# Patient Record
Sex: MALE | HISPANIC OR LATINO | URBAN - METROPOLITAN AREA
[De-identification: names, ages, dates, MRNs, and addresses within clinical notes are randomized per-mention and may not be internally consistent; named-entity substitution may affect disease eponyms.]

---

## 2017-02-08 ENCOUNTER — EMERGENCY (EMERGENCY)
Facility: HOSPITAL | Age: 42
LOS: 0 days | Discharge: HOME | End: 2017-02-08
Attending: EMERGENCY MEDICINE | Admitting: EMERGENCY MEDICINE
Payer: OTHER MISCELLANEOUS

## 2017-02-08 VITALS
OXYGEN SATURATION: 100 % | RESPIRATION RATE: 18 BRPM | TEMPERATURE: 99 F | DIASTOLIC BLOOD PRESSURE: 81 MMHG | HEART RATE: 64 BPM | SYSTOLIC BLOOD PRESSURE: 124 MMHG

## 2017-02-08 VITALS — WEIGHT: 179.9 LBS

## 2017-02-08 DIAGNOSIS — R07.89 OTHER CHEST PAIN: ICD-10-CM

## 2017-02-08 DIAGNOSIS — Z91.81 HISTORY OF FALLING: ICD-10-CM

## 2017-02-08 PROCEDURE — 99282 EMERGENCY DEPT VISIT SF MDM: CPT

## 2017-02-08 PROCEDURE — 71020: CPT | Mod: 26

## 2017-02-08 PROCEDURE — 71100 X-RAY EXAM RIBS UNI 2 VIEWS: CPT | Mod: 26,RT
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
## 2017-02-08 NOTE — ED STATDOCS - DETAILS:
I personally saw the patient with the PA, and completed the key components of the history and physical exam. I then discussed the management plan with the PA.  I, Tremayne Hernandez, performed the initial face to face bedside interview with this patient regarding history of present illness, review of symptoms and relevant past medical, social and family history.  I completed an independent physical examination.  I was the initial provider who evaluated this patient. I have signed out the follow up of any pending tests (i.e. labs, radiological studies) to the ACP.  I have communicated the patient’s plan of care and disposition with the ACP.  The history, relevant review of systems, past medical and surgical history, medical decision making, and physical examination was documented by the scribe in my presence and I attest to the accuracy of the documentation.

## 2017-02-08 NOTE — ED STATDOCS - PROGRESS NOTE DETAILS
RAUL Orellana: pt re-evaluated. appears comfortable. xray reviewed by dr vega and myself, no fracture seen. pt refused pain meds in ed. pt has pcp to follow up with. requesting to be discharged home. stable for discharge

## 2019-03-01 ENCOUNTER — EMERGENCY (EMERGENCY)
Facility: HOSPITAL | Age: 44
LOS: 0 days | Discharge: HOME | End: 2019-03-01
Attending: EMERGENCY MEDICINE | Admitting: EMERGENCY MEDICINE
Payer: COMMERCIAL

## 2019-03-01 VITALS
SYSTOLIC BLOOD PRESSURE: 139 MMHG | HEIGHT: 66 IN | RESPIRATION RATE: 18 BRPM | HEART RATE: 66 BPM | OXYGEN SATURATION: 98 % | WEIGHT: 169.98 LBS | DIASTOLIC BLOOD PRESSURE: 89 MMHG | TEMPERATURE: 98 F
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
## 2019-03-01 DIAGNOSIS — M25.512 PAIN IN LEFT SHOULDER: ICD-10-CM
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
## 2019-03-01 PROCEDURE — 73000 X-RAY EXAM OF COLLAR BONE: CPT | Mod: 26,LT

## 2019-03-01 PROCEDURE — 73030 X-RAY EXAM OF SHOULDER: CPT | Mod: 26,RT

## 2019-03-01 PROCEDURE — 99053 MED SERV 10PM-8AM 24 HR FAC: CPT

## 2019-03-01 PROCEDURE — 99283 EMERGENCY DEPT VISIT LOW MDM: CPT | Mod: 25

## 2019-03-01 RX ORDER — IBUPROFEN 200 MG
600 TABLET ORAL ONCE
Qty: 0 | Refills: 0 | Status: COMPLETED | OUTPATIENT
Start: 2019-03-01 | End: 2019-03-01

## 2019-03-01 RX ADMIN — Medication 600 MILLIGRAM(S): at 07:58
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
## 2022-05-31 ENCOUNTER — EMERGENCY (EMERGENCY)
Facility: HOSPITAL | Age: 47
LOS: 0 days | Discharge: HOME | End: 2022-05-31
Attending: EMERGENCY MEDICINE
Payer: COMMERCIAL

## 2022-05-31 VITALS
RESPIRATION RATE: 18 BRPM | OXYGEN SATURATION: 98 % | SYSTOLIC BLOOD PRESSURE: 117 MMHG | DIASTOLIC BLOOD PRESSURE: 67 MMHG | HEART RATE: 62 BPM | TEMPERATURE: 99 F

## 2022-05-31 VITALS — HEIGHT: 66 IN | WEIGHT: 199.96 LBS

## 2022-05-31 DIAGNOSIS — L25.8 UNSPECIFIED CONTACT DERMATITIS DUE TO OTHER AGENTS: ICD-10-CM

## 2022-05-31 DIAGNOSIS — L29.9 PRURITUS, UNSPECIFIED: ICD-10-CM

## 2022-05-31 DIAGNOSIS — R21 RASH AND OTHER NONSPECIFIC SKIN ERUPTION: ICD-10-CM

## 2022-05-31 PROCEDURE — 87798 DETECT AGENT NOS DNA AMP: CPT

## 2022-05-31 PROCEDURE — 36415 COLL VENOUS BLD VENIPUNCTURE: CPT

## 2022-05-31 PROCEDURE — 99284 EMERGENCY DEPT VISIT MOD MDM: CPT

## 2022-05-31 PROCEDURE — 86753 PROTOZOA ANTIBODY NOS: CPT

## 2022-05-31 PROCEDURE — 99283 EMERGENCY DEPT VISIT LOW MDM: CPT

## 2022-05-31 PROCEDURE — 86618 LYME DISEASE ANTIBODY: CPT

## 2022-05-31 RX ORDER — DIPHENHYDRAMINE HCL 50 MG
50 CAPSULE ORAL ONCE
Refills: 0 | Status: COMPLETED | OUTPATIENT
Start: 2022-05-31 | End: 2022-05-31

## 2022-05-31 RX ADMIN — Medication 60 MILLIGRAM(S): at 20:35

## 2022-05-31 RX ADMIN — Medication 50 MILLIGRAM(S): at 20:36

## 2022-05-31 NOTE — ED STATDOCS - PHYSICAL EXAMINATION
Constitutional: NAD AAOx3  Eyes: PERRLA EOMI  Head: Normocephalic atraumatic  Mouth: MMM  Cardiac: regular rate   Resp: Lungs CTAB  GI: Abd s/nt/nd  Neuro: CN2-12 intact  Skin: +large rash to let antecubital fossa, left upper arm, left chest wall. not vesicular mainly crusted over. one area on left abd that is target shaped.

## 2022-05-31 NOTE — ED STATDOCS - OBJECTIVE STATEMENT
48 y/o male presents to the ED with rash to left arm and abd. Pt states he was in contact with poison ivy 2 weeks ago, pt does yard work. Noticed poison ivy rash, which he has had before. Has tried OTC creams which did not help. Now pt c/o worsening itching. No fevers or joint aches.

## 2022-05-31 NOTE — ED STATDOCS - PATIENT PORTAL LINK FT
You can access the FollowMyHealth Patient Portal offered by [facility] by registering at the following website: [email or web address]. By joining [employer]’s FollowMyHealth portal, you will also be able to view your health information using other applications (apps) compatible with our system.

## 2022-05-31 NOTE — ED STATDOCS - PROGRESS NOTE DETAILS
48 y/o male presents to the ED for skin rash in the setting of being exposed to poison ivy. Will treat with steroids, send off tick panel and discharge. pt agrees with plan and will call for tick results. -Lynne Booth PA-C

## 2022-05-31 NOTE — ED ADULT TRIAGE NOTE - CHIEF COMPLAINT QUOTE
pt arrived c/o poison ivy on left arm for the last two weeks. pt states he has tried many creams on the rash with no relief. pt endorses itchiness and burning to the area. area appears dry and red. no sign of distress noted.

## 2022-05-31 NOTE — ED STATDOCS - NS ED ROS FT
Constitutional: No fever or chills  Eyes: No visual changes  HEENT: No throat pain  CV: No chest pain  Resp: No SOB no cough  GI: No abd pain, nausea or vomiting  : No dysuria  MSK: No musculoskeletal pain  Skin: +rash  Neuro: No headache

## 2022-05-31 NOTE — ED STATDOCS - ATTENDING APP SHARED VISIT CONTRIBUTION OF CARE
I, Franki Troy MD, personally saw the patient with ACP.  I have personally performed a face to face diagnostic evaluation on this patient.  I have reviewed the ACP note and agree with the history, exam, and plan of care, except as noted.   The initial assessment was performed by myself and then the patient was handed off to the ACP. The patient was followed and re-evaluated by the ACP. All labs, imaging and procedures were evaluated and performed by the ACP and I was available for consultation if any questions in the patients care came up.

## 2022-05-31 NOTE — ED ADULT NURSE NOTE - OBJECTIVE STATEMENT
patient axox3, c/o poison ivy exposure to left arm x2 weeks ago. patient endorsing pain, pruritis and rash to area. patient denies headache, vision changes, n/v/d, fever, chills, cough, chest pain, SOB. patient able to ambulate independently with a steady gait while maintaining safety.

## 2022-05-31 NOTE — ED STATDOCS - NS ED ATTENDING STATEMENT MOD
This was a shared visit with the KAYLA. I reviewed and verified the documentation and independently performed the documented:

## 2022-05-31 NOTE — ED ADULT TRIAGE NOTE - BSA (M2)
2

## 2022-05-31 NOTE — ED STATDOCS - NS_ ATTENDINGSCRIBEDETAILS _ED_A_ED_FT
I, Franki Troy MD,  performed the initial face to face bedside interview with this patient regarding history of present illness, review of symptoms and relevant past medical, social and family history.  I completed an independent physical examination.  I was the initial provider who evaluated this patient.   I personally saw the patient and performed a substantive portion of the visit including all aspects of the medical decision making.  The history, relevant review of systems, past medical and surgical history, medical decision making, and physical examination was documented by the scribe in my presence and I attest to the accuracy of the documentation.

## 2022-05-31 NOTE — ED STATDOCS - CLINICAL SUMMARY MEDICAL DECISION MAKING FREE TEXT BOX
46 y/o male presents to the ED for skin rash in the setting of being exposed to poison ivy. Will treat with steroids, send off tick panel and discharge.

## 2022-06-01 LAB
B BURGDOR C6 AB SER-ACNC: NEGATIVE — SIGNIFICANT CHANGE UP
B BURGDOR IGG+IGM SER-ACNC: 0.05 INDEX — SIGNIFICANT CHANGE UP (ref 0.01–0.89)

## 2022-06-03 LAB
A PHAGOCYTOPH DNA BLD QL NAA+PROBE: NEGATIVE — SIGNIFICANT CHANGE UP
B MICROTI IGG TITR SER: SIGNIFICANT CHANGE UP
B MICROTI IGM TITR SER: SIGNIFICANT CHANGE UP
E CHAFFEENSIS DNA BLD QL NAA+PROBE: NEGATIVE — SIGNIFICANT CHANGE UP
E EWINGII DNA SPEC QL NAA+PROBE: NEGATIVE — SIGNIFICANT CHANGE UP
EHRLICHIA DNA SPEC QL NAA+PROBE: NEGATIVE — SIGNIFICANT CHANGE UP

## 2023-05-26 NOTE — ED ADULT NURSE NOTE - FINAL NURSING ELECTRONIC SIGNATURE
----- Message from Jasper Avery MD sent at 5/26/2023 12:14 PM EDT -----  Labs stable.;however thyroid is underproducing....I have sent in a very low dose of syntheoid to be taken daily. We will recheck levels at his next revisit     Susy Patton MA   5/26/2023  1:53 PM EDT Back to Top      Lvm for pt to return call.        31-May-2022 21:05

## 2024-07-30 NOTE — ED STATDOCS - NSFOLLOWUPCLINICS_GEN_ALL_ED_FT
36.7
[facility] - [place]  [facility]  [address] [place] [id number]  Phone: [phone]  Fax: [phone]  Follow Up Time: Urgent
